# Patient Record
Sex: FEMALE | Race: WHITE | Employment: FULL TIME | URBAN - METROPOLITAN AREA
[De-identification: names, ages, dates, MRNs, and addresses within clinical notes are randomized per-mention and may not be internally consistent; named-entity substitution may affect disease eponyms.]

---

## 2018-03-13 LAB
ANTIBODY SCREEN, EXTERNAL: POSITIVE
CHLAMYDIA, EXTERNAL: NEGATIVE
HBSAG, EXTERNAL: NEGATIVE
HIV, EXTERNAL: NEGATIVE
N. GONORRHEA, EXTERNAL: NEGATIVE
RPR, EXTERNAL: NON REACTIVE
RUBELLA, EXTERNAL: NORMAL
TYPE, ABO & RH, EXTERNAL: NORMAL

## 2018-03-27 ENCOUNTER — HOSPITAL ENCOUNTER (OUTPATIENT)
Age: 33
Setting detail: OBSERVATION
Discharge: HOME | End: 2018-03-27
Attending: OBSTETRICS & GYNECOLOGY | Admitting: OBSTETRICS & GYNECOLOGY
Payer: COMMERCIAL

## 2018-03-27 VITALS
SYSTOLIC BLOOD PRESSURE: 111 MMHG | HEART RATE: 86 BPM | WEIGHT: 192 LBS | DIASTOLIC BLOOD PRESSURE: 62 MMHG | BODY MASS INDEX: 32.78 KG/M2 | HEIGHT: 64 IN | OXYGEN SATURATION: 100 %

## 2018-03-27 PROBLEM — Z3A.26 26 WEEKS GESTATION OF PREGNANCY: Status: ACTIVE | Noted: 2018-03-27

## 2018-03-27 PROBLEM — O47.00 PRETERM CONTRACTIONS: Status: ACTIVE | Noted: 2018-03-27

## 2018-03-27 PROBLEM — O09.219 HIGH RISK PREGNANCY DUE TO HISTORY OF PRETERM LABOR: Status: ACTIVE | Noted: 2018-03-27

## 2018-03-27 LAB
APPEARANCE UR: CLEAR
BILIRUB UR QL: NEGATIVE
COLOR UR: YELLOW
FIBRONECTIN FETAL VAG QL: NEGATIVE
GLUCOSE UR QL STRIP.AUTO: NEGATIVE MG/DL
KETONES UR-MCNC: NEGATIVE MG/DL
LEUKOCYTE ESTERASE UR QL STRIP: ABNORMAL
NITRITE UR QL: NEGATIVE
PH UR: 6 [PH] (ref 5–9)
PROT UR QL: NEGATIVE MG/DL
RBC # UR STRIP: NEGATIVE /UL
SERVICE CMNT-IMP: ABNORMAL
SP GR UR: 1.02 (ref 1–1.02)
UROBILINOGEN UR QL: 0.2 EU/DL (ref 0.2–1)

## 2018-03-27 PROCEDURE — 81003 URINALYSIS AUTO W/O SCOPE: CPT

## 2018-03-27 PROCEDURE — 82731 ASSAY OF FETAL FIBRONECTIN: CPT | Performed by: ADVANCED PRACTICE MIDWIFE

## 2018-03-27 PROCEDURE — 87109 MYCOPLASMA: CPT | Performed by: ADVANCED PRACTICE MIDWIFE

## 2018-03-27 PROCEDURE — 99218 HC RM OBSERVATION: CPT

## 2018-03-27 PROCEDURE — 87081 CULTURE SCREEN ONLY: CPT | Performed by: ADVANCED PRACTICE MIDWIFE

## 2018-03-27 PROCEDURE — 99282 EMERGENCY DEPT VISIT SF MDM: CPT

## 2018-03-27 PROCEDURE — 59025 FETAL NON-STRESS TEST: CPT

## 2018-03-27 PROCEDURE — 87491 CHLMYD TRACH DNA AMP PROBE: CPT | Performed by: ADVANCED PRACTICE MIDWIFE

## 2018-03-27 RX ORDER — AZITHROMYCIN 250 MG/1
500 TABLET, FILM COATED ORAL
Status: DISCONTINUED | OUTPATIENT
Start: 2018-03-27 | End: 2018-03-27

## 2018-03-27 RX ORDER — LEVOTHYROXINE SODIUM 75 UG/1
75 TABLET ORAL EVERY OTHER DAY
COMMUNITY

## 2018-03-27 RX ORDER — FERROUS SULFATE, DRIED 160(50) MG
1 TABLET, EXTENDED RELEASE ORAL 2 TIMES DAILY WITH MEALS
COMMUNITY

## 2018-03-27 NOTE — PROGRESS NOTES
1240 Patient is a  @ 26 weeks gestation w/ c/o abdominal cramping, and her concern is  labor. She states she had one child at 39 weeks and another child at 31 weeks gestation. Urine specimen obtained and patient oriented to Triage Bed 3. EFM/TOCO placed; abdomen palpates soft. Patient denies headache, blurry vision, epigastric pain; denies LOF or leaking blood; +FM. Call bell within reach. 1313 DANIELLE Buck CNM [facility] on unit and updated on patient status and patient chief complaint    1340 patient updated on poc including FFN ordered by Mariam Schroeder CNM; patient verbalized appropriate understanding. 1400 Strip reactive; verified w/ Nicole Vides, Charge RN    8234 DANIELLE Buck CNM [facility] at bedside; FFN collected; SVE closed, thick, high    1630 FFN negative; discharge order obtained; Educated patient regarding fetal kick counts, signs and symptoms of active labor, symptoms to report to MD, when to come back to hospital, and instructed on when to report symptoms to MD.  Discharge instructions reviewed and hard copies provided. Patient verbalized appropriate understanding. Patient ambulated off unit, no further concerns expressed at this time.
HPI:    Subjective:     [unfilled], 34 y.o.  , 1,1,0,2 at 26w0d presents to L&D with c/o cramping and abomdinal pain . Assessment:  Past Medical History:   Diagnosis Date    Abnormal Papanicolaou smear of cervix     Phlebitis and thrombophlebitis     Thyroid activity decreased      No past surgical history on file. No Known Allergies  Prior to Admission medications    Medication Sig Start Date End Date Taking? Authorizing Provider   PNV#16-Iron Fum & PS-FA-OM-3 35-1-200 mg cap Take  by mouth. Yes Historical Provider   levothyroxine (SYNTHROID) 75 mcg tablet Take 75 mcg by mouth Daily (before breakfast). Yes Historical Provider   calcium-vitamin D (OYSTER SHELL) 500 mg(1,250mg) -200 unit per tablet Take 1 Tab by mouth two (2) times daily (with meals). Yes Historical Provider        Objective:     Vitals:  Vitals:    18 1251   Weight: 87.1 kg (192 lb)   Height: 5' 4\" (1.626 m)        Physical Exam:  Patient without distress. Heart: Regular rate and rhythm, S1S2 present or without murmur or extra heart sounds  Lung: clear to auscultation throughout lung fields, no wheezes, no rales, no rhonchi and normal respiratory effort  Back: costovertebral angle tenderness absent  Abdomen: soft, nontender, gravid  Perineum: blood absent, amniotic fluid absent  Cervical Exam: closed  Lower Extremities:  - Edema No  Membranes:  Intact  Fetal Heart Rate: Reactive    U/A: Urine dipstick shows negative for all components, positive for few- leukocytes. Assessment/Plan:     Plan: FFN, GC/Chlamydia, GBS, ureaplasma sent to lab. If FFN discharge home. Follow up in office tomorrow for US for cervical lengths. DC home with standard & ER labor precautions.      Signed By:  Tobias Sherman CNM     2018
Floor

## 2018-03-27 NOTE — DISCHARGE INSTRUCTIONS
ULTRASOUND APPOINTMENT in your [place] OFFICE tomorrow, 3/28/17! DISCHARGE SUMMARY from Nurse    PATIENT INSTRUCTIONS:    After general anesthesia or intravenous sedation, for 24 hours or while taking prescription Narcotics:  · Limit your activities  · Do not drive and operate hazardous machinery  · Do not make important personal or business decisions  · Do  not drink alcoholic beverages  · If you have not urinated within 8 hours after discharge, please contact your surgeon on call. Report the following to your surgeon:  · Excessive pain, swelling, redness or odor of or around the surgical area  · Temperature over 100.5  · Nausea and vomiting lasting longer than 4 hours or if unable to take medications  · Any signs of decreased circulation or nerve impairment to extremity: change in color, persistent  numbness, tingling, coldness or increase pain  · Any questions    What to do at Home:  Recommended activity: Activity as tolerated,     If you experience any of the following symptoms gush of fluid or blood from your vagina; contractions 5 minutes apart, please come back to labor and delivery    *  Please give a list of your current medications to your Primary Care Provider. *  Please update this list whenever your medications are discontinued, doses are      changed, or new medications (including over-the-counter products) are added. *  Please carry medication information at all times in case of emergency situations. These are general instructions for a healthy lifestyle:    No smoking/ No tobacco products/ Avoid exposure to second hand smoke  Surgeon General's Warning:  Quitting smoking now greatly reduces serious risk to your health.     Obesity, smoking, and sedentary lifestyle greatly increases your risk for illness    A healthy diet, regular physical exercise & weight monitoring are important for maintaining a healthy lifestyle    You may be retaining fluid if you have a history of heart failure or if you experience any of the following symptoms:  Weight gain of 3 pounds or more overnight or 5 pounds in a week, increased swelling in our hands or feet or shortness of breath while lying flat in bed. Please call your doctor as soon as you notice any of these symptoms; do not wait until your next office visit. Recognize signs and symptoms of STROKE:    F-face looks uneven    A-arms unable to move or move unevenly    S-speech slurred or non-existent    T-time-call 911 as soon as signs and symptoms begin-DO NOT go       Back to bed or wait to see if you get better-TIME IS BRAIN. Warning Signs of HEART ATTACK     Call 911 if you have these symptoms:   Chest discomfort. Most heart attacks involve discomfort in the center of the chest that lasts more than a few minutes, or that goes away and comes back. It can feel like uncomfortable pressure, squeezing, fullness, or pain.  Discomfort in other areas of the upper body. Symptoms can include pain or discomfort in one or both arms, the back, neck, jaw, or stomach.  Shortness of breath with or without chest discomfort.  Other signs may include breaking out in a cold sweat, nausea, or lightheadedness. Don't wait more than five minutes to call 911 - MINUTES MATTER! Fast action can save your life. Calling 911 is almost always the fastest way to get lifesaving treatment. Emergency Medical Services staff can begin treatment when they arrive -- up to an hour sooner than if someone gets to the hospital by car. The discharge information has been reviewed with the patient. The patient verbalized understanding. Discharge medications reviewed with the patient and appropriate educational materials and side effects teaching were provided.   ___________________________________________________________________________________________________________________________________

## 2018-03-27 NOTE — IP AVS SNAPSHOT
[address] [place] [id number] 
[phone] Patient: Eden Ty MRN: [id number] ZRT:[birth year] A check cameron indicates which time of day the medication should be taken. My Medications ASK your doctor about these medications Instructions Each Dose to Equal  
 Morning Noon Evening Bedtime  
 calcium-vitamin D 500 mg(1,250mg) -200 unit per tablet Commonly known as:  OYSTER SHELL Your last dose was: Your next dose is: Take 1 Tab by mouth two (2) times daily (with meals). 1 Tab PNV#16-Iron Fum & PS-FA-OM-3 35-1-200 mg Cap Your last dose was: Your next dose is: Take  by mouth. SYNTHROID 75 mcg tablet Generic drug:  levothyroxine Your last dose was: Your next dose is: Take 75 mcg by mouth Daily (before breakfast). 75 mcg

## 2018-03-27 NOTE — IP AVS SNAPSHOT
Summary of Care Report The Summary of Care report has been created to help improve care coordination. Users with access to [employer] or [address] (Web-based application) may access additional patient information including the Discharge Summary. If you are not currently a [address] user and need more information, please call the number listed below in the [place] [address] section and ask to be connected with Medical Records. Facility Information Name Address Phone [facility] [place] [address] [place] [address] [phone] Patient Information Patient Name Sex WILBERTO Valadez ([id number]) Female 1985 Discharge Information Admitting Provider Service Area Unit Oletta Riedel, MD / [phone] [address] [facility] [address] [facility] / [phone] Discharge Provider Discharge Date/Time Discharge Disposition Destination (none) 3/27/2018 Afternoon (Pending) AHR (none) Patient Language Language ENGLISH [13] Hospital Problems as of 3/27/2018  Never Reviewed Class Noted - Resolved Last Modified POA Active Problems High risk pregnancy due to history of  labor  3/27/2018 - Present 3/27/2018 by Oletta Riedel, MD Unknown Entered by Oletta Riedel, MD  
   contractions  3/27/2018 - Present 3/27/2018 by Edi Huynh CNM Unknown Entered by Edi Huynh CNM  
  26 weeks gestation of pregnancy  3/27/2018 - Present 3/27/2018 by Edi Huynh CNM Unknown Entered by Edi Huynh CNM You are allergic to the following No active allergies Current Discharge Medication List  
  
ASK your doctor about these medications Dose & Instructions Dispensing Information Comments  
 calcium-vitamin D 500 mg(1,250mg) -200 unit per tablet Commonly known as:  OYSTER SHELL Dose:  1 Tab Take 1 Tab by mouth two (2) times daily (with meals). Refills:  0 PNV#16-Iron Fum & PS-FA-OM-3 35-1-200 mg Cap Take  by mouth. Refills:  0  
   
 SYNTHROID 75 mcg tablet Generic drug:  levothyroxine Dose:  75 mcg Take 75 mcg by mouth Daily (before breakfast). Refills:  0 Follow-up Information Follow up With Details Comments Contact Info None   None (395) Patient stated that they have no PCP Discharge Instructions ULTRASOUND APPOINTMENT in your [facility] OFFICE tomorrow, 3/28/17! DISCHARGE SUMMARY from Nurse PATIENT INSTRUCTIONS: 
 
 
F-face looks uneven A-arms unable to move or move unevenly S-speech slurred or non-existent T-time-call 911 as soon as signs and symptoms begin-DO NOT go Back to bed or wait to see if you get better-TIME IS BRAIN. Warning Signs of HEART ATTACK Call 911 if you have these symptoms: 
? Chest discomfort. Most heart attacks involve discomfort in the center of the chest that lasts more than a few minutes, or that goes away and comes back. It can feel like uncomfortable pressure, squeezing, fullness, or pain. ? Discomfort in other areas of the upper body. Symptoms can include pain or discomfort in one or both arms, the back, neck, jaw, or stomach. ? Shortness of breath with or without chest discomfort. ? Other signs may include breaking out in a cold sweat, nausea, or lightheadedness. Don't wait more than five minutes to call [address]! Fast action can save your life. Calling 911 is almost always the fastest way to get lifesaving treatment.  Emergency Medical Services staff can begin treatment when they arrive  up to an hour sooner than if someone gets to the hospital by car. The discharge information has been reviewed with the patient. The patient verbalized understanding. Discharge medications reviewed with the patient and appropriate educational materials and side effects teaching were provided. ___________________________________________________________________________________________________________________________________ Chart Review Routing History No Routing History on File

## 2018-03-27 NOTE — IP AVS SNAPSHOT
[address] [address] 
[phone] Patient: Danii Mora MRN: [id number] EVJ: About your hospitalization You were admitted on:  N/A You last received care in the:  [place] [address] [facility] L&D TRIAGE You were discharged on:  2018 Why you were hospitalized Your primary diagnosis was:  Not on File Your diagnoses also included:  High Risk Pregnancy Due To History Of  Labor,  Contractions, 26 Weeks Gestation Of Pregnancy Follow-up Information Follow up With Details Comments Contact Info None   None (395) Patient stated that they have no PCP Discharge Orders None A check cameron indicates which time of day the medication should be taken. My Medications ASK your doctor about these medications Instructions Each Dose to Equal  
 Morning Noon Evening Bedtime  
 calcium-vitamin D 500 mg(1,250mg) -200 unit per tablet Commonly known as:  OYSTER SHELL Your last dose was: Your next dose is: Take 1 Tab by mouth two (2) times daily (with meals). 1 Tab PNV#16-Iron Fum & PS-FA-OM-3 35-1-200 mg Cap Your last dose was: Your next dose is: Take  by mouth. SYNTHROID 75 mcg tablet Generic drug:  levothyroxine Your last dose was: Your next dose is: Take 75 mcg by mouth Daily (before breakfast). 75 mcg Discharge Instructions ULTRASOUND APPOINTMENT in your [facility] OFFICE tomorrow, 3/28/17! DISCHARGE SUMMARY from Nurse PATIENT INSTRUCTIONS: 
 
 
F-face looks uneven A-arms unable to move or move unevenly S-speech slurred or non-existent T-time-call 911 as soon as signs and symptoms begin-DO NOT go Back to bed or wait to see if you get better-TIME IS BRAIN. Warning Signs of HEART ATTACK Call 911 if you have these symptoms: 
? Chest discomfort. Most heart attacks involve discomfort in the center of the chest that lasts more than a few minutes, or that goes away and comes back. It can feel like uncomfortable pressure, squeezing, fullness, or pain. ? Discomfort in other areas of the upper body. Symptoms can include pain or discomfort in one or both arms, the back, neck, jaw, or stomach. ? Shortness of breath with or without chest discomfort. ? Other signs may include breaking out in a cold sweat, nausea, or lightheadedness. Don't wait more than five minutes to call [address]! Fast action can save your life. Calling 911 is almost always the fastest way to get lifesaving treatment. Emergency Medical Services staff can begin treatment when they arrive  up to an hour sooner than if someone gets to the hospital by car. The discharge information has been reviewed with the patient. The patient verbalized understanding. Discharge medications reviewed with the patient and appropriate educational materials and side effects teaching were provided. ___________________________________________________________________________________________________________________________________ Introducing [facility]! Ryan Marshall introduces Modern Message patient portal. Now you can access parts of your medical record, email your doctor's office, and request medication refills online. 1. In your internet browser, go to [email or web address]"t 2. Click on the First Time User? Click Here link in the Sign In box. You will see the New Member Sign Up page. 3. Enter your Modern Message Access Code exactly as it appears below.  You will not need to use this code after youve completed the sign-up process. If you do not sign up before the expiration date, you must request a new code. · [employer] Access Code: [email or web address] Expires: 6/25/2018  4:27 PM 
 
4. Enter the last four digits of your Social Security Number (xxxx) and Date of Birth (mm/dd/yyyy) as indicated and click Submit. You will be taken to the next sign-up page. 5. Create a Agrican ID. This will be your Agrican login ID and cannot be changed, so think of one that is secure and easy to remember. 6. Create a [employer] password. You can change your password at any time. 7. Enter your Password Reset Question and Answer. This can be used at a later time if you forget your password. 8. Enter your e-mail address. You will receive e-mail notification when new information is available in [address] 9. Click Sign Up. You can now view and download portions of your medical record. 10. Click the Download Summary menu link to download a portable copy of your medical information. If you have questions, please visit the Frequently Asked Questions section of the [employer] website. Remember, Agrican is NOT to be used for urgent needs. For medical emergencies, dial 911. Now available from your iPhone and Android! Introducing Srinivasa Gurrola As a [facility] patient, I wanted to make you aware of our electronic visit tool called Srinivasa Izabela. [facility] 24/7 allows you to connect within minutes with a medical provider 24 hours a day, seven days a week via a mobile device or tablet or logging into a secure website from your computer. You can access Srinivasa Gurrola from anywhere in the [place].  
 
A virtual visit might be right for you when you have a simple condition and feel like you just dont want to get out of bed, or cant get away from work for an appointment, when your regular [facility] provider is not available (evenings, weekends or holidays), or when youre out of town and need minor care. Electronic visits cost only $49 and if the [employer] 24/7 provider determines a prescription is needed to treat your condition, one can be electronically transmitted to a nearby pharmacy*. Please take a moment to enroll today if you have not already done so. The enrollment process is free and takes just a few minutes. To enroll, please download the Webflakes/7 malia to your tablet or phone, or visit [email or web address] to enroll on your computer. And, as an [address] [place] [address] patient with a [employer] account, the results of your visits will be scanned into your electronic medical record and your primary care provider will be able to view the scanned results. We urge you to continue to see your regular [facility] provider for your ongoing medical care. And while your primary care provider may not be the one available when you seek a Fast FiBR virtual visit, the peace of mind you get from getting a real diagnosis real time can be priceless. For more information on Fast FiBR, view our Frequently Asked Questions (FAQs) at [email or web address]. Sincerely, 
 
Andres Alonso MD 
Chief Medical Officer [facility] Thelma Best *:  certain medications cannot be prescribed via Fast FiBR Unresulted Labs-Please follow up with your PCP about these lab tests Order Current Status CHLAMYDIA/NEISSERIA AMPLIFICATION In process CULTURE, GENITAL GROUP B STREP In process UREAPLASMA / MYCO CULTURE In process Providers Seen During Your Hospitalization Provider Specialty Primary office phone Noris Rapp MD Obstetrics & Gynecology [phone] Your Primary Care Physician (PCP) Primary Care Physician Office Phone Office Fax NONE ** None ** ** None ** You are allergic to the following No active allergies Recent Documentation Height Weight BMI OB Status Smoking Status 1.626 m 87.1 kg 32.96 kg/m2 Pregnant Never Smoker Emergency Contacts Name Discharge Info Relation Home Work Mobile Blaine Boogie DISCHARGE CAREGIVER [3] Spouse [3]   [phone] Patient Belongings The following personal items are in your possession at time of discharge: 
                             
 
  
  
Discharge Instructions Attachments/References PREGNANCY: WEEKS 26 TO 30 (ENGLISH) Patient Handouts Weeks 26 to 30 of Your Pregnancy: Care Instructions Your Care Instructions You are now in your last trimester of pregnancy. Your baby is growing rapidly. And you'll probably feel your baby moving around more often. Your doctor may ask you to count your baby's kicks. Your back may ache as your body gets used to your baby's size and length. If you haven't already had the Tdap shot during this pregnancy, talk to your doctor about getting it. It will help protect your  against pertussis infection. During this time, it's important to take care of yourself and pay attention to what your body needs. If you feel sexual, explore ways to be close with your partner that match your comfort and desire. Use the tips provided in this care sheet to find ways to be sexual in your own way. Follow-up care is a key part of your treatment and safety. Be sure to make and go to all appointments, and call your doctor if you are having problems. It's also a good idea to know your test results and keep a list of the medicines you take. How can you care for yourself at home? Take it easy at work · Take frequent breaks. If possible, stop working when you are tired, and rest during your lunch hour. · Take bathroom breaks every 2 hours. · Change positions often. If you sit for long periods, stand up and walk around.  
· When you stand for a long time, keep one foot on a low stool with your knee bent. After standing a lot, sit with your feet up. · Avoid fumes, chemicals, and tobacco smoke. Be sexual in your own way · Having sex during pregnancy is okay, unless your doctor tells you not to. · You may be very interested in sex, or you may have no interest at all. · Your growing belly can make it hard to find a good position during intercourse. [place] and explore. · You may get cramps in your uterus when your partner touches your breasts. · A back rub may relieve the backache or cramps that sometimes follow orgasm. Learn about  labor · Watch for signs of  labor. You may be going into labor if: 
¨ You have menstrual-like cramps, with or without nausea. ¨ You have about 4 or more contractions in 20 minutes, or about 8 or more within 1 hour, even after you have had a glass of water and are resting. ¨ You have a low, dull backache that does not go away when you change your position. ¨ You have pain or pressure in your pelvis that comes and goes in a pattern. ¨ You have intestinal cramping or flu-like symptoms, with or without diarrhea. ¨ You notice an increase or change in your vaginal discharge. Discharge may be heavy, mucus-like, watery, or streaked with blood. ¨ Your water breaks. · If you think you have  labor: ¨ Drink 2 or 3 glasses of water or juice. Not drinking enough fluids can cause contractions. ¨ Stop what you are doing, and empty your bladder. Then lie down on your left side for at least 1 hour. ¨ While lying on your side, find your breast bone. Put your fingers in the soft spot just below it. Move your fingers down toward your belly button to find the top of your uterus. Check to see if it is tight. ¨ Contractions can be weak or strong. Record your contractions for an hour. Time a contraction from the start of one contraction to the start of the next one.  
¨ Single or several strong contractions without a pattern are called Bernabe-Mcgrath contractions. They are practice contractions but not the start of labor. They often stop if you change what you are doing. ¨ Call your doctor if you have regular contractions. Where can you learn more? Go to http://dea-jose a.info/. Enter M469 in the search box to learn more about \"Weeks 26 to 30 of Your Pregnancy: Care Instructions. \" Current as of: March 16, 2017 Content Version: 11.4 © 2006-2017 Healthwise, Incorporated. Care instructions adapted under license by [employer] (which disclaims liability or warranty for this information). If you have questions about a medical condition or this instruction, always ask your healthcare professional. [address] any warranty or liability for your use of this information. Please provide this summary of care documentation to your next provider. Signatures-by signing, you are acknowledging that this After Visit Summary has been reviewed with you and you have received a copy. Patient Signature:  ____________________________________________________________ Date:  ____________________________________________________________  
  
Melvin Burrell Provider Signature:  ____________________________________________________________ Date:  ____________________________________________________________

## 2018-03-28 LAB
C TRACH RRNA SPEC QL NAA+PROBE: NEGATIVE
N GONORRHOEA RRNA SPEC QL NAA+PROBE: NEGATIVE
SPECIMEN SOURCE: NORMAL

## 2018-03-30 LAB
BACTERIA SPEC CULT: NEGATIVE
SERVICE CMNT-IMP: NORMAL

## 2018-04-02 LAB
M HOMINIS SPEC QL CULT: NEGATIVE
SPECIMEN SOURCE: NORMAL
U UREALYTICUM SPEC QL CULT: NEGATIVE

## 2018-06-06 LAB — GRBS, EXTERNAL: NEGATIVE

## 2018-06-27 ENCOUNTER — ANESTHESIA (OUTPATIENT)
Dept: LABOR AND DELIVERY | Age: 33
End: 2018-06-27
Payer: COMMERCIAL

## 2018-06-27 ENCOUNTER — ANESTHESIA EVENT (OUTPATIENT)
Dept: LABOR AND DELIVERY | Age: 33
End: 2018-06-27
Payer: COMMERCIAL

## 2018-06-27 ENCOUNTER — HOSPITAL ENCOUNTER (INPATIENT)
Age: 33
LOS: 2 days | Discharge: HOME | End: 2018-06-29
Attending: OBSTETRICS & GYNECOLOGY | Admitting: OBSTETRICS & GYNECOLOGY
Payer: COMMERCIAL

## 2018-06-27 PROBLEM — M54.9 BACK PAIN WITH SCIATICA: Status: ACTIVE | Noted: 2018-06-27

## 2018-06-27 PROBLEM — M54.30 BACK PAIN WITH SCIATICA: Status: ACTIVE | Noted: 2018-06-27

## 2018-06-27 PROBLEM — Z3A.39 39 WEEKS GESTATION OF PREGNANCY: Status: ACTIVE | Noted: 2018-06-27

## 2018-06-27 PROBLEM — Z33.1 IUP (INTRAUTERINE PREGNANCY), INCIDENTAL: Status: ACTIVE | Noted: 2018-06-27

## 2018-06-27 LAB
ABO + RH BLD: NORMAL
BASOPHILS # BLD: 0 K/UL (ref 0–0.06)
BASOPHILS NFR BLD: 0 % (ref 0–2)
BLOOD GROUP ANTIBODIES SERPL: NORMAL
DIFFERENTIAL METHOD BLD: ABNORMAL
EOSINOPHIL # BLD: 0.1 K/UL (ref 0–0.4)
EOSINOPHIL NFR BLD: 1 % (ref 0–5)
ERYTHROCYTE [DISTWIDTH] IN BLOOD BY AUTOMATED COUNT: 15.4 % (ref 11.6–14.5)
HCT VFR BLD AUTO: 29.7 % (ref 35–45)
HGB BLD-MCNC: 9.2 G/DL (ref 12–16)
LYMPHOCYTES # BLD: 1.9 K/UL (ref 0.9–3.6)
LYMPHOCYTES NFR BLD: 27 % (ref 21–52)
MCH RBC QN AUTO: 22.9 PG (ref 24–34)
MCHC RBC AUTO-ENTMCNC: 31 G/DL (ref 31–37)
MCV RBC AUTO: 74.1 FL (ref 74–97)
MONOCYTES # BLD: 0.8 K/UL (ref 0.05–1.2)
MONOCYTES NFR BLD: 11 % (ref 3–10)
NEUTS SEG # BLD: 4.4 K/UL (ref 1.8–8)
NEUTS SEG NFR BLD: 61 % (ref 40–73)
PLATELET # BLD AUTO: 264 K/UL (ref 135–420)
PMV BLD AUTO: 8.9 FL (ref 9.2–11.8)
RBC # BLD AUTO: 4.01 M/UL (ref 4.2–5.3)
SPECIMEN EXP DATE BLD: NORMAL
WBC # BLD AUTO: 7.1 K/UL (ref 4.6–13.2)

## 2018-06-27 PROCEDURE — 65270000029 HC RM PRIVATE

## 2018-06-27 PROCEDURE — 74011250636 HC RX REV CODE- 250/636: Performed by: ADVANCED PRACTICE MIDWIFE

## 2018-06-27 PROCEDURE — 77030018749 HC HK AMNIO DISP DERY -A

## 2018-06-27 PROCEDURE — 75410000002 HC LABOR FEE PER 1 HR

## 2018-06-27 PROCEDURE — 74011250636 HC RX REV CODE- 250/636

## 2018-06-27 PROCEDURE — 10907ZC DRAINAGE OF AMNIOTIC FLUID, THERAPEUTIC FROM PRODUCTS OF CONCEPTION, VIA NATURAL OR ARTIFICIAL OPENING: ICD-10-PCS | Performed by: OBSTETRICS & GYNECOLOGY

## 2018-06-27 PROCEDURE — 74011000250 HC RX REV CODE- 250

## 2018-06-27 PROCEDURE — 85025 COMPLETE CBC W/AUTO DIFF WBC: CPT | Performed by: OBSTETRICS & GYNECOLOGY

## 2018-06-27 PROCEDURE — 74011250637 HC RX REV CODE- 250/637: Performed by: ADVANCED PRACTICE MIDWIFE

## 2018-06-27 PROCEDURE — 77030034849

## 2018-06-27 PROCEDURE — 3E0R3BZ INTRODUCTION OF ANESTHETIC AGENT INTO SPINAL CANAL, PERCUTANEOUS APPROACH: ICD-10-PCS | Performed by: ANESTHESIOLOGY

## 2018-06-27 PROCEDURE — 00HU33Z INSERTION OF INFUSION DEVICE INTO SPINAL CANAL, PERCUTANEOUS APPROACH: ICD-10-PCS | Performed by: ANESTHESIOLOGY

## 2018-06-27 PROCEDURE — 74011250636 HC RX REV CODE- 250/636: Performed by: OBSTETRICS & GYNECOLOGY

## 2018-06-27 PROCEDURE — 86900 BLOOD TYPING SEROLOGIC ABO: CPT | Performed by: OBSTETRICS & GYNECOLOGY

## 2018-06-27 PROCEDURE — 74011250636 HC RX REV CODE- 250/636: Performed by: ANESTHESIOLOGY

## 2018-06-27 PROCEDURE — 75410000003 HC RECOV DEL/VAG/CSECN EA 0.5 HR

## 2018-06-27 PROCEDURE — 75410000000 HC DELIVERY VAGINAL/SINGLE

## 2018-06-27 PROCEDURE — 76060000078 HC EPIDURAL ANESTHESIA

## 2018-06-27 RX ORDER — FENTANYL CITRATE 50 UG/ML
INJECTION, SOLUTION INTRAMUSCULAR; INTRAVENOUS
Status: COMPLETED
Start: 2018-06-27 | End: 2018-06-27

## 2018-06-27 RX ORDER — OXYTOCIN IN 5 % DEXTROSE 30/500 ML
.5-2 PLASTIC BAG, INJECTION (ML) INTRAVENOUS
Status: DISCONTINUED | OUTPATIENT
Start: 2018-06-27 | End: 2018-06-29 | Stop reason: HOSPADM

## 2018-06-27 RX ORDER — IBUPROFEN 400 MG/1
800 TABLET ORAL
Status: DISCONTINUED | OUTPATIENT
Start: 2018-06-27 | End: 2018-06-29 | Stop reason: HOSPADM

## 2018-06-27 RX ORDER — FENTANYL CITRATE 50 UG/ML
INJECTION, SOLUTION INTRAMUSCULAR; INTRAVENOUS AS NEEDED
Status: DISCONTINUED | OUTPATIENT
Start: 2018-06-27 | End: 2018-06-27 | Stop reason: HOSPADM

## 2018-06-27 RX ORDER — PROMETHAZINE HYDROCHLORIDE 25 MG/ML
25 INJECTION, SOLUTION INTRAMUSCULAR; INTRAVENOUS
Status: DISCONTINUED | OUTPATIENT
Start: 2018-06-27 | End: 2018-06-29 | Stop reason: HOSPADM

## 2018-06-27 RX ORDER — ROPIVACAINE HYDROCHLORIDE 2 MG/ML
INJECTION, SOLUTION EPIDURAL; INFILTRATION; PERINEURAL AS NEEDED
Status: DISCONTINUED | OUTPATIENT
Start: 2018-06-27 | End: 2018-06-27 | Stop reason: HOSPADM

## 2018-06-27 RX ORDER — TERBUTALINE SULFATE 1 MG/ML
0.25 INJECTION SUBCUTANEOUS
Status: DISCONTINUED | OUTPATIENT
Start: 2018-06-27 | End: 2018-06-28 | Stop reason: HOSPADM

## 2018-06-27 RX ORDER — LIDOCAINE HYDROCHLORIDE 10 MG/ML
20 INJECTION, SOLUTION EPIDURAL; INFILTRATION; INTRACAUDAL; PERINEURAL AS NEEDED
Status: DISCONTINUED | OUTPATIENT
Start: 2018-06-27 | End: 2018-06-28 | Stop reason: HOSPADM

## 2018-06-27 RX ORDER — SODIUM CHLORIDE, SODIUM LACTATE, POTASSIUM CHLORIDE, CALCIUM CHLORIDE 600; 310; 30; 20 MG/100ML; MG/100ML; MG/100ML; MG/100ML
125 INJECTION, SOLUTION INTRAVENOUS CONTINUOUS
Status: DISCONTINUED | OUTPATIENT
Start: 2018-06-27 | End: 2018-06-28 | Stop reason: HOSPADM

## 2018-06-27 RX ORDER — AMOXICILLIN 250 MG
1 CAPSULE ORAL
Status: DISCONTINUED | OUTPATIENT
Start: 2018-06-27 | End: 2018-06-29 | Stop reason: HOSPADM

## 2018-06-27 RX ORDER — MINERAL OIL
30 OIL (ML) ORAL AS NEEDED
Status: COMPLETED | OUTPATIENT
Start: 2018-06-27 | End: 2018-06-27

## 2018-06-27 RX ORDER — SODIUM CHLORIDE 0.9 % (FLUSH) 0.9 %
5-10 SYRINGE (ML) INJECTION EVERY 8 HOURS
Status: DISCONTINUED | OUTPATIENT
Start: 2018-06-27 | End: 2018-06-28 | Stop reason: HOSPADM

## 2018-06-27 RX ORDER — FENTANYL/ROPIVACAINE/NS/PF 2MCG/ML-.1
1-15 PLASTIC BAG, INJECTION (ML) EPIDURAL
Status: DISCONTINUED | OUTPATIENT
Start: 2018-06-27 | End: 2018-06-28 | Stop reason: HOSPADM

## 2018-06-27 RX ORDER — OXYTOCIN/RINGER'S LACTATE 20/1000 ML
125 PLASTIC BAG, INJECTION (ML) INTRAVENOUS CONTINUOUS
Status: DISCONTINUED | OUTPATIENT
Start: 2018-06-27 | End: 2018-06-28 | Stop reason: HOSPADM

## 2018-06-27 RX ORDER — NALOXONE HYDROCHLORIDE 0.4 MG/ML
0.2 INJECTION, SOLUTION INTRAMUSCULAR; INTRAVENOUS; SUBCUTANEOUS AS NEEDED
Status: DISCONTINUED | OUTPATIENT
Start: 2018-06-27 | End: 2018-06-28 | Stop reason: HOSPADM

## 2018-06-27 RX ORDER — LEVOTHYROXINE SODIUM 75 UG/1
75 TABLET ORAL EVERY OTHER DAY
Status: DISCONTINUED | OUTPATIENT
Start: 2018-06-29 | End: 2018-06-28

## 2018-06-27 RX ORDER — SODIUM CHLORIDE 0.9 % (FLUSH) 0.9 %
5-10 SYRINGE (ML) INJECTION AS NEEDED
Status: DISCONTINUED | OUTPATIENT
Start: 2018-06-27 | End: 2018-06-28 | Stop reason: HOSPADM

## 2018-06-27 RX ORDER — LIDOCAINE HYDROCHLORIDE 20 MG/ML
INJECTION, SOLUTION EPIDURAL; INFILTRATION; INTRACAUDAL; PERINEURAL AS NEEDED
Status: DISCONTINUED | OUTPATIENT
Start: 2018-06-27 | End: 2018-06-27 | Stop reason: HOSPADM

## 2018-06-27 RX ORDER — OXYTOCIN IN 5 % DEXTROSE 30/500 ML
PLASTIC BAG, INJECTION (ML) INTRAVENOUS
Status: COMPLETED
Start: 2018-06-27 | End: 2018-06-27

## 2018-06-27 RX ORDER — METHYLERGONOVINE MALEATE 0.2 MG/ML
0.2 INJECTION INTRAVENOUS AS NEEDED
Status: DISCONTINUED | OUTPATIENT
Start: 2018-06-27 | End: 2018-06-27

## 2018-06-27 RX ORDER — BUTORPHANOL TARTRATE 2 MG/ML
2 INJECTION INTRAMUSCULAR; INTRAVENOUS
Status: DISCONTINUED | OUTPATIENT
Start: 2018-06-27 | End: 2018-06-27

## 2018-06-27 RX ORDER — METHYLERGONOVINE MALEATE 0.2 MG/ML
0.2 INJECTION INTRAVENOUS AS NEEDED
Status: DISCONTINUED | OUTPATIENT
Start: 2018-06-27 | End: 2018-06-28 | Stop reason: HOSPADM

## 2018-06-27 RX ORDER — NALBUPHINE HYDROCHLORIDE 10 MG/ML
10 INJECTION, SOLUTION INTRAMUSCULAR; INTRAVENOUS; SUBCUTANEOUS
Status: DISCONTINUED | OUTPATIENT
Start: 2018-06-27 | End: 2018-06-27

## 2018-06-27 RX ORDER — HYDROMORPHONE HYDROCHLORIDE 2 MG/ML
1 INJECTION, SOLUTION INTRAMUSCULAR; INTRAVENOUS; SUBCUTANEOUS
Status: DISCONTINUED | OUTPATIENT
Start: 2018-06-27 | End: 2018-06-28 | Stop reason: HOSPADM

## 2018-06-27 RX ORDER — OXYTOCIN/RINGER'S LACTATE 20/1000 ML
125 PLASTIC BAG, INJECTION (ML) INTRAVENOUS CONTINUOUS
Status: DISCONTINUED | OUTPATIENT
Start: 2018-06-27 | End: 2018-06-27

## 2018-06-27 RX ORDER — OXYCODONE AND ACETAMINOPHEN 5; 325 MG/1; MG/1
2 TABLET ORAL
Status: DISCONTINUED | OUTPATIENT
Start: 2018-06-27 | End: 2018-06-29 | Stop reason: HOSPADM

## 2018-06-27 RX ORDER — TERBUTALINE SULFATE 1 MG/ML
0.25 INJECTION SUBCUTANEOUS
Status: DISCONTINUED | OUTPATIENT
Start: 2018-06-27 | End: 2018-06-27

## 2018-06-27 RX ORDER — OXYTOCIN/RINGER'S LACTATE 20/1000 ML
500 PLASTIC BAG, INJECTION (ML) INTRAVENOUS ONCE
Status: DISPENSED | OUTPATIENT
Start: 2018-06-27 | End: 2018-06-27

## 2018-06-27 RX ORDER — LEVOTHYROXINE SODIUM 50 UG/1
50 TABLET ORAL EVERY OTHER DAY
Refills: 0 | COMMUNITY
Start: 2018-06-04 | End: 2018-06-29

## 2018-06-27 RX ORDER — ONDANSETRON 2 MG/ML
4 INJECTION INTRAMUSCULAR; INTRAVENOUS
Status: DISCONTINUED | OUTPATIENT
Start: 2018-06-27 | End: 2018-06-28 | Stop reason: HOSPADM

## 2018-06-27 RX ORDER — FENTANYL CITRATE 50 UG/ML
100 INJECTION, SOLUTION INTRAMUSCULAR; INTRAVENOUS ONCE
Status: COMPLETED | OUTPATIENT
Start: 2018-06-27 | End: 2018-06-27

## 2018-06-27 RX ORDER — LIDOCAINE HYDROCHLORIDE AND EPINEPHRINE 15; 5 MG/ML; UG/ML
INJECTION, SOLUTION EPIDURAL AS NEEDED
Status: DISCONTINUED | OUTPATIENT
Start: 2018-06-27 | End: 2018-06-27 | Stop reason: HOSPADM

## 2018-06-27 RX ORDER — HYDROMORPHONE HYDROCHLORIDE 2 MG/ML
1 INJECTION, SOLUTION INTRAMUSCULAR; INTRAVENOUS; SUBCUTANEOUS
Status: DISCONTINUED | OUTPATIENT
Start: 2018-06-27 | End: 2018-06-27

## 2018-06-27 RX ORDER — OXYTOCIN/RINGER'S LACTATE 20/1000 ML
500 PLASTIC BAG, INJECTION (ML) INTRAVENOUS ONCE
Status: ACTIVE | OUTPATIENT
Start: 2018-06-27 | End: 2018-06-27

## 2018-06-27 RX ORDER — SODIUM CHLORIDE, SODIUM LACTATE, POTASSIUM CHLORIDE, CALCIUM CHLORIDE 600; 310; 30; 20 MG/100ML; MG/100ML; MG/100ML; MG/100ML
125 INJECTION, SOLUTION INTRAVENOUS CONTINUOUS
Status: DISCONTINUED | OUTPATIENT
Start: 2018-06-27 | End: 2018-06-27

## 2018-06-27 RX ORDER — LIDOCAINE HYDROCHLORIDE 10 MG/ML
20 INJECTION, SOLUTION EPIDURAL; INFILTRATION; INTRACAUDAL; PERINEURAL AS NEEDED
Status: DISCONTINUED | OUTPATIENT
Start: 2018-06-27 | End: 2018-06-27

## 2018-06-27 RX ORDER — MINERAL OIL
30 OIL (ML) ORAL AS NEEDED
Status: DISCONTINUED | OUTPATIENT
Start: 2018-06-27 | End: 2018-06-27

## 2018-06-27 RX ORDER — FENTANYL/ROPIVACAINE/NS/PF 2MCG/ML-.1
PLASTIC BAG, INJECTION (ML) EPIDURAL
Status: COMPLETED
Start: 2018-06-27 | End: 2018-06-27

## 2018-06-27 RX ORDER — ZOLPIDEM TARTRATE 5 MG/1
5 TABLET ORAL
Status: DISCONTINUED | OUTPATIENT
Start: 2018-06-27 | End: 2018-06-29 | Stop reason: HOSPADM

## 2018-06-27 RX ORDER — DIPHENHYDRAMINE HYDROCHLORIDE 50 MG/ML
25 INJECTION, SOLUTION INTRAMUSCULAR; INTRAVENOUS
Status: DISCONTINUED | OUTPATIENT
Start: 2018-06-27 | End: 2018-06-28 | Stop reason: HOSPADM

## 2018-06-27 RX ORDER — NALBUPHINE HYDROCHLORIDE 10 MG/ML
10 INJECTION, SOLUTION INTRAMUSCULAR; INTRAVENOUS; SUBCUTANEOUS
Status: DISCONTINUED | OUTPATIENT
Start: 2018-06-27 | End: 2018-06-28 | Stop reason: HOSPADM

## 2018-06-27 RX ORDER — BUTORPHANOL TARTRATE 2 MG/ML
2 INJECTION INTRAMUSCULAR; INTRAVENOUS
Status: DISCONTINUED | OUTPATIENT
Start: 2018-06-27 | End: 2018-06-28 | Stop reason: HOSPADM

## 2018-06-27 RX ORDER — ACETAMINOPHEN 325 MG/1
650 TABLET ORAL
Status: DISCONTINUED | OUTPATIENT
Start: 2018-06-27 | End: 2018-06-29 | Stop reason: HOSPADM

## 2018-06-27 RX ORDER — LEVOTHYROXINE SODIUM 100 UG/1
50 TABLET ORAL EVERY OTHER DAY
Status: DISCONTINUED | OUTPATIENT
Start: 2018-06-28 | End: 2018-06-28

## 2018-06-27 RX ADMIN — ROPIVACAINE HYDROCHLORIDE 14 ML/HR: 10 INJECTION, SOLUTION EPIDURAL at 17:53

## 2018-06-27 RX ADMIN — FENTANYL CITRATE 100 MCG: 50 INJECTION, SOLUTION INTRAMUSCULAR; INTRAVENOUS at 18:43

## 2018-06-27 RX ADMIN — Medication 125 ML/HR: at 20:51

## 2018-06-27 RX ADMIN — ROPIVACAINE HYDROCHLORIDE 3 ML: 2 INJECTION, SOLUTION EPIDURAL; INFILTRATION; PERINEURAL at 11:44

## 2018-06-27 RX ADMIN — Medication 2 MILLI-UNITS/MIN: at 08:30

## 2018-06-27 RX ADMIN — SODIUM CHLORIDE, SODIUM LACTATE, POTASSIUM CHLORIDE, AND CALCIUM CHLORIDE 125 ML/HR: 600; 310; 30; 20 INJECTION, SOLUTION INTRAVENOUS at 08:30

## 2018-06-27 RX ADMIN — ROPIVACAINE HYDROCHLORIDE 4 ML: 2 INJECTION, SOLUTION EPIDURAL; INFILTRATION; PERINEURAL at 11:46

## 2018-06-27 RX ADMIN — FENTANYL CITRATE 100 MCG: 50 INJECTION, SOLUTION INTRAMUSCULAR; INTRAVENOUS at 11:46

## 2018-06-27 RX ADMIN — FENTANYL CITRATE 100 MCG: 50 INJECTION, SOLUTION INTRAMUSCULAR; INTRAVENOUS at 11:17

## 2018-06-27 RX ADMIN — ROPIVACAINE HYDROCHLORIDE 10 ML/HR: 10 INJECTION, SOLUTION EPIDURAL at 11:54

## 2018-06-27 RX ADMIN — ROPIVACAINE HYDROCHLORIDE 6 ML: 2 INJECTION, SOLUTION EPIDURAL; INFILTRATION; PERINEURAL at 17:39

## 2018-06-27 RX ADMIN — LIDOCAINE HYDROCHLORIDE 6 ML: 20 INJECTION, SOLUTION EPIDURAL; INFILTRATION; INTRACAUDAL; PERINEURAL at 18:59

## 2018-06-27 RX ADMIN — IBUPROFEN 800 MG: 400 TABLET ORAL at 23:17

## 2018-06-27 RX ADMIN — Medication 30 ML: at 20:35

## 2018-06-27 RX ADMIN — ROPIVACAINE HYDROCHLORIDE 3 ML: 2 INJECTION, SOLUTION EPIDURAL; INFILTRATION; PERINEURAL at 11:45

## 2018-06-27 RX ADMIN — FENTANYL CITRATE 100 MCG: 50 INJECTION, SOLUTION INTRAMUSCULAR; INTRAVENOUS at 18:53

## 2018-06-27 RX ADMIN — LIDOCAINE HYDROCHLORIDE AND EPINEPHRINE 3 ML: 15; 5 INJECTION, SOLUTION EPIDURAL at 11:41

## 2018-06-27 RX ADMIN — SODIUM CHLORIDE, SODIUM LACTATE, POTASSIUM CHLORIDE, AND CALCIUM CHLORIDE 1000 ML: 600; 310; 30; 20 INJECTION, SOLUTION INTRAVENOUS at 11:05

## 2018-06-27 NOTE — PROGRESS NOTES
Labor Progress Note  Patient seen, fetal heart rate and contraction pattern evaluated and reassuring, Patient examined. Patient Vitals for the past 1 hrs:   BP Pulse SpO2   06/27/18 1355 - - 99 %   06/27/18 1350 - - 100 %   06/27/18 1346 108/59 68 -   06/27/18 1345 - - 100 %   06/27/18 1340 - - 100 %   06/27/18 1335 - - 100 %   06/27/18 1331 99/67 73 -   06/27/18 1330 - - 100 %   06/27/18 1325 - - 99 %   06/27/18 1320 - - 100 %   06/27/18 1316 103/56 72 -   06/27/18 1315 - - 99 %       Physical Exam:  Cervical Exam:  3 cm dilated    80% effaced    -2 station    Presenting Part: cephalic  Cervical Position: posterior  Consistency: Medium  Membranes:  Intact  Uterine Activity: Frequency: Every 2-4 minutes  Fetal Heart Rate: Reactive  Baseline: 120 per minute  Variability: moderate  Accelerations: yes  Decelerations: none    Assessment/Plan:  Reassuring fetal status, Labor  Continue expectant management. Continue pitocin induction. Continue plan for vaginal delivery. Repositioning and use of peanut ball for labor progress.

## 2018-06-27 NOTE — PROGRESS NOTES
7558  Patient who is  39+1 week gestation  Arrives on unit for schedule induction due to sciatica. Patient is taken to room 6 , given gown. IV started, labs drawn, assessment and documents completed. Pitocin started at 0830.    1005 Patient up to bathroom    1045  Patient up to bathroom    1110 Patient up to bathroom    12 Paged anesthesia for epidural.  Doc will arrive shortly. 1120 SVE 3/80/-3 done by ZANDRA Campbell Dr. At bedside/TO/Consents    1140 Thread    1141 test    1144 bolus    Asked patient about Valtrex mentioned in chart. She told me that the Valtrex must have been \"mischarted\" and she \"does not take it and has no history. \"    1300 Patient or right side. 1326 Patient on left side with peanut ball. 1357 SVE 3/80/-2 per Anastasiia Chambers CNM    1400 Patient on left side with peanut ball    1442 Patient on back. 1452 Patient on right side with peanut ball. 1534 Patient sitting upright    1704 SVE 4/80/-2 per Herrera Maher 28 Paged Dr. Waylon Wilson for epidural bolus as patient is reporting no relief from epidural.    1714 JAMES Duong at bedside. Advised increasing basal to 11. She gave bolus. 65 Dr. Waylon Wilson at bedside again . Patient reports pain has not lessened from last bolus. Left side  Is less numb than right. Will bolus epidural with medication. Advises putting basal rate up to 14.      1750 SVE 6/80/-2 per ZANDRA Melton RN    9931 SHERLYN Bañuelos at bedside. AROM clear fluid. SVE 6/80/0    1800 Patient on left side with peanut ball    1820 Paged anesthesia to request new epidural as patient is reporting no relief. [address] Edda MACIAS at bedside. SVE 7/100/0.     1830 Stopped pitocin per Anastasiia Chambers CNM and with patient request.    Jm Gill at bedside    1844 TO    1858 test dose    1858 Catheter thread     1901 Fentanyl to anesthesia    1904 bolus    1930 Patient on left side with peanut ball.     1920 Bedside shift change report given to Yariel Looney RN (oncoming nurse) by Robby Connolly RN (offgoing nurse). Report included the following information SBAR, Procedure Summary, Intake/Output, MAR and Recent Results.

## 2018-06-27 NOTE — IP AVS SNAPSHOT
[address] [address] 
[phone] Patient: Edinson Clemens MRN: [id number] TLU:4/39/3416 A check cameron indicates which time of day the medication should be taken. My Medications START taking these medications Instructions Each Dose to Equal  
 Morning Noon Evening Bedtime  
 ibuprofen 800 mg tablet Commonly known as:  MOTRIN Your last dose was: Your next dose is: Take 1 Tab by mouth every eight (8) hours as needed. 800 mg  
    
   
   
   
  
 predniSONE 10 mg tablet Commonly known as:  Alley Aver Your last dose was: Your next dose is: Take 4 Tabs by mouth daily (with breakfast). Take 40 mg x 2 days, followed by 30 mg x 2 days, 20 mg x2 days and then 10 mg x 2 days 40 mg CHANGE how you take these medications Instructions Each Dose to Equal  
 Morning Noon Evening Bedtime SYNTHROID 75 mcg tablet Generic drug:  levothyroxine What changed:  Another medication with the same name was removed. Continue taking this medication, and follow the directions you see here. Your last dose was: Your next dose is: Take 75 mcg by mouth every other day. 75 mcg CONTINUE taking these medications Instructions Each Dose to Equal  
 Morning Noon Evening Bedtime  
 calcium-vitamin D 500 mg(1,250mg) -200 unit per tablet Commonly known as:  OYSTER SHELL Your last dose was: Your next dose is: Take 1 Tab by mouth two (2) times daily (with meals). 1 Tab PNV#16-Iron Fum & PS-FA-OM-3 35-1-200 mg Cap Your last dose was: Your next dose is: Take  by mouth. Where to Get Your Medications Information on where to get these meds will be given to you by the nurse or doctor. ! Ask your nurse or doctor about these medications  
  ibuprofen 800 mg tablet  
 predniSONE 10 mg tablet

## 2018-06-27 NOTE — ANESTHESIA PROCEDURE NOTES
Epidural Block    Start time: 6/27/2018 11:30 AM  Performed by: Milagros Rodriguez  Authorized by: Milagros Rodriguez     Pre-Procedure  Indication: labor epidural    Preanesthetic Checklist: patient identified, risks and benefits discussed, anesthesia consent, site marked, patient being monitored, timeout performed and anesthesia consent    Timeout Time: 11:34        Epidural:   Patient position:  Seated  Prep region:  Lumbar  Prep: Chlorhexidine    Location:  L3-4    Needle and Epidural Catheter:   Needle Type:  Tuohy  Needle Gauge:  17 G  Injection Technique:  Loss of resistance using saline  Attempts:  2  Catheter Size:  20 G  Catheter at Skin Depth (cm):  5  Depth in Epidural Space (cm):  7  Events: no blood with aspiration, no cerebrospinal fluid with aspiration, no paresthesia and negative aspiration test    Test Dose:  Negative and lidocaine 1.5% w/ epi    Assessment:   Catheter Secured:  Tegaderm and tape  Insertion:  Uncomplicated  Patient tolerance:  Patient tolerated the procedure well with no immediate complications  Unable to thread catheter on 1st attempt. Repeat DAVIAN w/ PFNS. DAVIAN @ 7cm (same depth). Catheter threaded w/o difficulty.

## 2018-06-27 NOTE — IP AVS SNAPSHOT
[address] [address] 
[phone] Patient: Arsh Avitia MRN: [id number] MYN:2/24/2514 About your hospitalization You were admitted on:  June 27, 2018 You last received care in the:  [address] [place] [address] You were discharged on:  June 29, 2018 Why you were hospitalized Your primary diagnosis was:  Normal Vaginal Delivery Your diagnoses also included:  Iup (Intrauterine Pregnancy), Incidental, 39 Weeks Gestation Of Pregnancy, Back Pain With Sciatica Follow-up Information Follow up With Details Comments Contact Info None   None ([phone]) Patient stated that they have no PCP Irasema Sanford CNM Schedule an appointment as soon as possible for a visit in 6 weeks  [address] [place] [address] [address] [place] [address] 
[phone] Discharge Orders None A check cameron indicates which time of day the medication should be taken. My Medications START taking these medications Instructions Each Dose to Equal  
 Morning Noon Evening Bedtime  
 ibuprofen 800 mg tablet Commonly known as:  MOTRIN Your last dose was: Your next dose is: Take 1 Tab by mouth every eight (8) hours as needed. 800 mg  
    
   
   
   
  
 predniSONE 10 mg tablet Commonly known as:  Emre Razor Your last dose was: Your next dose is: Take 4 Tabs by mouth daily (with breakfast). Take 40 mg x 2 days, followed by 30 mg x 2 days, 20 mg x2 days and then 10 mg x 2 days 40 mg CHANGE how you take these medications Instructions Each Dose to Equal  
 Morning Noon Evening Bedtime SYNTHROID 75 mcg tablet Generic drug:  levothyroxine What changed:  Another medication with the same name was removed. Continue taking this medication, and follow the directions you see here. Your last dose was: Your next dose is: Take 75 mcg by mouth every other day. 75 mcg CONTINUE taking these medications Instructions Each Dose to Equal  
 Morning Noon Evening Bedtime  
 calcium-vitamin D 500 mg(1,250mg) -200 unit per tablet Commonly known as:  OYSTER SHELL Your last dose was: Your next dose is: Take 1 Tab by mouth two (2) times daily (with meals). 1 Tab PNV#16-Iron Fum & PS-FA-OM-3 35-1-200 mg Cap Your last dose was: Your next dose is: Take  by mouth. Where to Get Your Medications Information on where to get these meds will be given to you by the nurse or doctor. ! Ask your nurse or doctor about these medications  
  ibuprofen 800 mg tablet  
 predniSONE 10 mg tablet Discharge Instructions POST DELIVERY DISCHARGE INSTRUCTIONS Name: Esther Alcantara YOB: 1985 Primary Diagnosis: Active Problems: 
  IUP (intrauterine pregnancy), incidental (6/27/2018) 39 weeks gestation of pregnancy (6/27/2018) Back pain with sciatica (6/27/2018) General:  
 
Diet/Diet Restrictions: 
Eight 8-ounce glasses of fluid daily (water, juices); avoid excessive caffeine intake. Meals/snacks as desired which are high in fiber and carbohydrates and low in fat and cholesterol. Physical Activity / Restrictions / Safety:  
 
Avoid heavy lifting, no more than the baby alone (not the baby in the car seat). Avoid intercourse until you are seen at your postpartum visit. No douching or tampon use. Check with obstetrician before starting or resuming an exercise program.    
 
 
Discharge Instructions/Special Treatment/Home Care Needs:  
 
Continue prenatal vitamins. Continue to use squirt bottle with warm water on your perineum after each bathroom use until bleeding stops. Call your doctor for the following: Fever over 101 degrees by mouth. Vaginal bleeding that soaks two pads per hour for more than one hour. Red streaks or increased swelling of legs, painful red streaks on your breast. 
If you feel extremely anxious or overwhelmed. If you have thoughts of harming yourself and/or your baby. Pain Management:  
 
Pain Management:  
Take Acetaminophen (Tylenol) or Ibuprofen (Advil, Motrin), as directed for pain. Use a warm Sitz bath 3 times daily to relieve perineal or hemorrhoidal discomfort. For hemorrhoidal discomfort, use Tucks and Anusol cream as needed and directed. Follow-Up Care:  
 
Appointment with MD: Follow-up Appointments Procedures  FOLLOW UP VISIT Appointment in: 6 Weeks Standing Status:   Standing Number of Occurrences:   1 Order Specific Question:   Appointment in Answer:   6 Weeks Telephone number: [phone] Signed By: COLLEEN [employer] Activation Thank you for requesting access to Track. Please follow the instructions below to securely access and download your online medical record. Track allows you to send messages to your doctor, view your test results, renew your prescriptions, schedule appointments, and more. How Do I Sign Up? 1. In your internet browser, go to [email or web address] 
2. Click on the First Time User? Click Here link in the Sign In box. You will be redirect to the New Member Sign Up page. 3. Enter your Track Access Code exactly as it appears below. You will not need to use this code after youve completed the sign-up process. If you do not sign up before the expiration date, you must request a new code. Track Access Code: [email or web address] Expires: 2018 10:23 AM (This is the date your Track access code will ) 4. Enter the last four digits of your Social Security Number (xxxx) and Date of Birth (mm/dd/yyyy) as indicated and click Submit. You will be taken to the next sign-up page. 5. Create a "[employer] ID. This will be your "[employer] login ID and cannot be changed, so think of one that is secure and easy to remember. 6. Create a "[employer] password. You can change your password at any time. 7. Enter your Password Reset Question and Answer. This can be used at a later time if you forget your password. 8. Enter your e-mail address. You will receive e-mail notification when new information is available in [address] 9. Click Sign Up. You can now view and download portions of your medical record. 10. Click the Download Summary menu link to download a portable copy of your medical information. Additional Information If you have questions, please visit the Frequently Asked Questions section of the "[employer] website at [email or web address] Remember, "[employer] is NOT to be used for urgent needs. For medical emergencies, dial 911. Patient armband removed and shredded Introducing [facility]! Jimy Felton introduces "[employer] patient portal. Now you can access parts of your medical record, email your doctor's office, and request medication refills online. 1. In your internet browser, go to [email or web address] 2. Click on the First Time User? Click Here link in the Sign In box. You will see the New Member Sign Up page. 3. Enter your "[employer] Access Code exactly as it appears below. You will not need to use this code after youve completed the sign-up process. If you do not sign up before the expiration date, you must request a new code. · "[employer] Access Code: [email or web address] Expires: 9/27/2018 10:23 AM 
 
4. Enter the last four digits of your Social Security Number (xxxx) and Date of Birth (mm/dd/yyyy) as indicated and click Submit. You will be taken to the next sign-up page. 5. Create a [employer] ID. This will be your [employer] login ID and cannot be changed, so think of one that is secure and easy to remember. 6. Create a [employer] password. You can change your password at any time. 7. Enter your Password Reset Question and Answer. This can be used at a later time if you forget your password. 8. Enter your e-mail address. You will receive e-mail notification when new information is available in [address] 9. Click Sign Up. You can now view and download portions of your medical record. 10. Click the Download Summary menu link to download a portable copy of your medical information. If you have questions, please visit the Frequently Asked Questions section of the [employer] website. Remember, [employer] is NOT to be used for urgent needs. For medical emergencies, dial 911. Now available from your iPhone and Android! Introducing Srinivasa Gurrola As a [facility] patient, I wanted to make you aware of our electronic visit tool called Srinivasa Mariafin. [facility] 24/7 allows you to connect within minutes with a medical provider 24 hours a day, seven days a week via a mobile device or tablet or logging into a secure website from your computer. You can access Srinivasa Candacefin from anywhere in the [place]. A virtual visit might be right for you when you have a simple condition and feel like you just dont want to get out of bed, or cant get away from work for an appointment, when your regular [facility] provider is not available (evenings, weekends or holidays), or when youre out of town and need minor care. Electronic visits cost only $49 and if the [facility] 24/7 provider determines a prescription is needed to treat your condition, one can be electronically transmitted to a nearby pharmacy*. Please take a moment to enroll today if you have not already done so. The enrollment process is free and takes just a few minutes.   To enroll, please download the [employer] 24/7 malia to your tablet or phone, or visit [email or web address] to enroll on your computer. And, as an [address] [place] [address] patient with a [employer] account, the results of your visits will be scanned into your electronic medical record and your primary care provider will be able to view the scanned results. We urge you to continue to see your regular [employer] [facility] provider for your ongoing medical care. And while your primary care provider may not be the one available when you seek a [facility] [facility] virtual visit, the peace of mind you get from getting a real diagnosis real time can be priceless. For more information on [facility], view our Frequently Asked Questions (FAQs) at [email or web address]. Sincerely, 
 
Cesar Vale MD 
Chief Medical Officer [facility] Thelma Best *:  certain medications cannot be prescribed via [employer] Providers Seen During Your Hospitalization Provider Specialty Primary office phone Leslie Krishnan MD Obstetrics & Gynecology [phone] Your Primary Care Physician (PCP) Primary Care Physician Office Phone Office Fax NONE ** None ** ** None ** You are allergic to the following No active allergies Recent Documentation Height Weight Breastfeeding? BMI OB Status Smoking Status 1.626 m 93.4 kg Unknown 35.36 kg/m2 Recent pregnancy Never Smoker Emergency Contacts Name Discharge Info Relation Home Work Mobile ChuyitaBlaine DISCHARGE CAREGIVER [3] Spouse [3]   [phone] Patient Belongings The following personal items are in your possession at time of discharge: 
  Dental Appliances: None         Home Medications: Kept at bedside   Jewelry: Ring  Clothing: At bedside    Other Valuables: Cell Phone Discharge Instructions Attachments/References STROKE: SYMPTOMS: GENERAL INFO (ENGLISH) Patient Handouts Learning About FAST: Stroke Warning Signs What is FAST? FAST is a simple way to remember the main symptoms of stroke. Recognizing these symptoms helps you know when to call for medical help. FAST stands for: 
· Face drooping. · Arm weakness. · Speech difficulty. · Time to call 911. What happens when you have a stroke? A stroke occurs when a blood vessel to the brain bursts or is blocked by a blood clot. Within minutes, the nerve cells in that part of the brain die. As a result, the part of the body controlled by those cells cannot work properly. The effects of a stroke may range from mild to severe. They may get better, or they may last the rest of your life. A stroke can affect vision, speech, behavior, thought processes, and your ability to move. It can cause symptoms that may include: 
· Sudden numbness, tingling, weakness, or loss of movement in your face, arm, or leg, especially on only one side of your body. · Sudden vision changes. · Sudden trouble speaking. · Sudden confusion or trouble understanding simple statements. · Sudden problems with walking or balance. · A sudden, severe headache that is different from past headaches. Why is it important to get help FAST? Quick treatment may save your life. And it may reduce the damage in your brain so that you have fewer problems after the stroke. When you know the FAST symptoms, you will know when it's important to call for medical help. Where can you learn more? Go to http://dea-jose a.info/. Enter L991 in the search box to learn more about \"Learning About FAST: Stroke Warning Signs. \" Current as of: March 20, 2017 Content Version: 11.4 © 3225-1704 [employer]. Care instructions adapted under license by [employer] (which disclaims liability or warranty for this information).  If you have questions about a medical condition or this instruction, always ask your healthcare professional. Christelle Tolbert[employer] disclaims any warranty or liability for your use of this information. Please provide this summary of care documentation to your next provider. Signatures-by signing, you are acknowledging that this After Visit Summary has been reviewed with you and you have received a copy. Patient Signature:  ____________________________________________________________ Date:  ____________________________________________________________  
  
Berkley Moulds Provider Signature:  ____________________________________________________________ Date:  ____________________________________________________________

## 2018-06-27 NOTE — H&P
History & Physical    Name: Josh Joseph MRN: [id number]  SSN: [id number]    YOB: 1985  Age: 34 y.o. Sex: female        Subjective:     Estimated Date of Delivery: 7/3/18  OB History      Para Term  AB Living    3 2    2    SAB TAB Ectopic Molar Multiple Live Births                     Ms. Savannah Allen is admitted with pregnancy at 36w3d for induction of labor. Prenatal course was complicated by hypothyroidism and sciatica. Hypothyroidism being followed by Dr. Keith Clemens. She is on oral Synthroid with rotating dose 75 mg and 50 mg every other day. Patient also has hx of HPV and  delivery with last child @v 30 wks. Please see prenatal records for details. No Known Allergies    Objective:     Vitals:  Vitals:    18 1147 18 1150 18 1153 18 1155   BP: 131/81  119/49 120/54   Pulse: 92  79 80   Resp:       Temp:       SpO2:  100%  100%   Weight:       Height:            Physical Exam:  Patient without distress. Heart: Regular rate and rhythm or S1S2 present  Lung: clear to auscultation throughout lung fields, no wheezes, no rales, no rhonchi and normal respiratory effort  Back: costovertebral angle tenderness absent  Abdomen: soft, nontender  Fundus: soft and non tender  Perineum: blood absent, amniotic fluid absent  Cervical Exam: 380/-3 by nurse exam  Lower Extremities:  - Edema No   - No evidence of DVT seen on physical exam.  No cords or calf tenderness. No significant calf/ankle edema. Membranes:  Intact  Fetal Heart Rate & Contraction pattern: Reactive  Baseline: 125 per minute  Variability: moderate  Accelerations: yes  Uterine contractions: regular, every 2-3 minutes    Prenatal Labs:   No results found for: RUBELLAEXT, GRBSEXT, HBSAGEXT, HIVEXT, RPREXT, GONNOEXT, CHLAMEXT      Assessment/Plan:     Plan: Admit for induction for sciatica. Reassuring fetal status. Pitocin IV drip to be titrated for adequate labor and reassuring fetal status.   Labor Progressing normally, Continue plan for vaginal delivery. Group B Strep was negative.     Signed By:  Natacha Call CNM     June 27, 2018

## 2018-06-27 NOTE — ANESTHESIA PREPROCEDURE EVALUATION
Anesthetic History   No history of anesthetic complications            Review of Systems / Medical History  Patient summary reviewed, nursing notes reviewed and pertinent labs reviewed    Pulmonary          Smoker         Neuro/Psych   Within defined limits           Cardiovascular  Within defined limits                Exercise tolerance: >4 METS     GI/Hepatic/Renal                Endo/Other             Other Findings   Comments: \"Sciatica\" right         Physical Exam    Airway  Mallampati: II  TM Distance: 4 - 6 cm  Neck ROM: normal range of motion   Mouth opening: Normal     Cardiovascular               Dental  No notable dental hx       Pulmonary                 Abdominal         Other Findings            Anesthetic Plan    ASA: 2  Anesthesia type: epidural            Anesthetic plan and risks discussed with: Patient      Risks of epidural, including but not limited to bleeding, infection, back pain, headache, seizure, nerve injury, and block failure discussed with patient and accepted.

## 2018-06-27 NOTE — ANESTHESIA PROCEDURE NOTES
Epidural Block    Start time: 6/27/2018 6:44 PM  End time: 6/27/2018 7:02 PM  Performed by: Reyna Ely  Authorized by: Reyna Ely     Pre-Procedure  Indication: labor epidural    Timeout Time: 18:44        Epidural:   Patient position:  Seated  Prep region:  Lumbar  Prep: Chlorhexidine    Location:  L2-3    Needle and Epidural Catheter:   Needle Type:  Tuohy  Needle Gauge:  17 G  Injection Technique:  Loss of resistance using saline and loss of resistance using air  Attempts:  1  Catheter Size:  19 G  Catheter at Skin Depth (cm):  12  Depth in Epidural Space (cm):  9  Events: no blood with aspiration, no cerebrospinal fluid with aspiration, no paresthesia and negative aspiration test    Test Dose:  Lidocaine 1.5% w/ epi and negative    Assessment:   Catheter Secured:  Tape  Insertion:  Complicated  Patient tolerance:  Patient tolerated the procedure well with no immediate complications  Patient previous epidural not giving satisfactory relief. Requests epidural replacement. DAVIAN at 9 cm paramedian approach needed to get DAVIAN.

## 2018-06-28 LAB
HCT VFR BLD AUTO: 28.5 % (ref 35–45)
HGB BLD-MCNC: 9 G/DL (ref 12–16)

## 2018-06-28 PROCEDURE — 65270000029 HC RM PRIVATE

## 2018-06-28 PROCEDURE — 74011250637 HC RX REV CODE- 250/637: Performed by: ADVANCED PRACTICE MIDWIFE

## 2018-06-28 PROCEDURE — 85018 HEMOGLOBIN: CPT | Performed by: ADVANCED PRACTICE MIDWIFE

## 2018-06-28 PROCEDURE — 36415 COLL VENOUS BLD VENIPUNCTURE: CPT | Performed by: ADVANCED PRACTICE MIDWIFE

## 2018-06-28 PROCEDURE — 85014 HEMATOCRIT: CPT | Performed by: ADVANCED PRACTICE MIDWIFE

## 2018-06-28 PROCEDURE — 74011636637 HC RX REV CODE- 636/637: Performed by: ADVANCED PRACTICE MIDWIFE

## 2018-06-28 RX ORDER — IBUPROFEN 800 MG/1
800 TABLET ORAL
Qty: 90 TAB | Refills: 1 | Status: SHIPPED | OUTPATIENT
Start: 2018-06-28

## 2018-06-28 RX ORDER — PREDNISONE 20 MG/1
60 TABLET ORAL
Status: COMPLETED | OUTPATIENT
Start: 2018-06-28 | End: 2018-06-29

## 2018-06-28 RX ORDER — LEVOTHYROXINE SODIUM 100 UG/1
50 TABLET ORAL EVERY OTHER DAY
Status: DISCONTINUED | OUTPATIENT
Start: 2018-06-29 | End: 2018-06-29 | Stop reason: HOSPADM

## 2018-06-28 RX ORDER — PREDNISONE 10 MG/1
40 TABLET ORAL
Qty: 20 TAB | Refills: 0 | Status: SHIPPED | OUTPATIENT
Start: 2018-06-28

## 2018-06-28 RX ORDER — LEVOTHYROXINE SODIUM 75 UG/1
75 TABLET ORAL EVERY OTHER DAY
Status: DISCONTINUED | OUTPATIENT
Start: 2018-06-28 | End: 2018-06-29 | Stop reason: HOSPADM

## 2018-06-28 RX ORDER — CYCLOBENZAPRINE HCL 10 MG
10 TABLET ORAL
Status: DISCONTINUED | OUTPATIENT
Start: 2018-06-28 | End: 2018-06-29 | Stop reason: HOSPADM

## 2018-06-28 RX ADMIN — OXYCODONE HYDROCHLORIDE AND ACETAMINOPHEN 2 TABLET: 5; 325 TABLET ORAL at 20:38

## 2018-06-28 RX ADMIN — LEVOTHYROXINE SODIUM 75 MCG: 75 TABLET ORAL at 08:35

## 2018-06-28 RX ADMIN — CYCLOBENZAPRINE HYDROCHLORIDE 10 MG: 10 TABLET, FILM COATED ORAL at 16:02

## 2018-06-28 RX ADMIN — OXYCODONE HYDROCHLORIDE AND ACETAMINOPHEN 2 TABLET: 5; 325 TABLET ORAL at 03:49

## 2018-06-28 RX ADMIN — IBUPROFEN 800 MG: 400 TABLET ORAL at 09:23

## 2018-06-28 RX ADMIN — PREDNISONE 60 MG: 20 TABLET ORAL at 11:57

## 2018-06-28 RX ADMIN — OXYCODONE HYDROCHLORIDE AND ACETAMINOPHEN 2 TABLET: 5; 325 TABLET ORAL at 14:29

## 2018-06-28 NOTE — PROGRESS NOTES
0715 Bedside and verbal report received from Olga Linares RN. Assessment complete. Oriented to room and call bell. 0730 Mother feeding infant a bottle. 0915 Pt up to shower. [address] Anibal CNM at bedside. Discussed plan of care and increased sciatica pain. 1200 Family at bedside. 1435 TRANSFER - OUT REPORT:    Verbal report given to SDouglas Elizabeth Torres (name) on [facility]  being transferred to postpartum (unit) for routine progression of care       Report consisted of patients Situation, Background, Assessment and   Recommendations(SBAR). Information from the following report(s) SBAR, Kardex, Intake/Output and MAR was reviewed with the receiving nurse. Lines:   Peripheral IV 06/27/18 Left Forearm (Active)   Site Assessment Clean, dry, & intact 6/28/2018  9:15 AM   Phlebitis Assessment 0 6/28/2018  9:15 AM   Infiltration Assessment 0 6/28/2018  9:15 AM   Dressing Status Clean, dry, & intact 6/28/2018  9:15 AM   Dressing Type Tape;Transparent 6/28/2018  9:15 AM   Hub Color/Line Status Pink 6/28/2018  9:15 AM   Alcohol Cap Used Yes 6/28/2018  9:15 AM        Opportunity for questions and clarification was provided.       Patient transported with:   Registered Nurse

## 2018-06-28 NOTE — L&D DELIVERY NOTE
Delivery Note    Obstetrician:  Angelic Banks CNM    Assistant: none    Pre-Delivery Diagnosis: Term pregnancy, Induced labor or Pregnancy complicated by: hypothyroidism and sciatica    Post-Delivery Diagnosis: Living  infant(s) or Female    Intrapartum Event: None    Procedure: Spontaneous vaginal delivery, Nasopharyngeal/Oropharyngeal Suctioning or Warming/Drying    Epidural: YES    Monitor:  Fetal Heart Tones - External and Uterine Contractions - External    Indications for instrumental delivery: none    Estimated Blood Loss: 100 cc    Episiotomy: n/a    Laceration(s):  n/a    Laceration(s) repair: NO    Presentation: Cephalic    Fetal Description: iglesias    Fetal Position: Left Occiput Anterior    Birth Weight: 35872 g    Birth Length: 48 cm    Apgar - One Minute: 8    Apgar - Five Minutes: 9    Umbilical Cord: 3 vessels present and Cord blood collected for DNA ID card and also sent to lab for type, Rh, and Cortez' test    Placenta: Spontaneous delivery, intact. Specimens: no           Complications:  Shoulder dystocia    Comments: Maternal effort able to bring fetal head to . Infant head delivered in OA position and allowed to restitute to NOELLE. Gentle downward traction unable to release right anterior shoulder. Shoulder dystocia was identified and Neonatologist Molly Cheng and Nursery staff called. Mother was then instructed to stop pushing, and Asmita maneuver was attempted along with suprapubic pressure and maternal pushing effort. When this did not result in immediate release of anterior shoulder, the next maneuver was attempted. Osman Sandifer with Asmita was attempted and resulted in successful release of anterior right shoulder followed by quick delivery of posterior shoulder and infant body. Infant dried and stimulated and placed on maternal abdomen. Bulb suctioned done by LD nurse and resulted in strong cry. Cord quickly clamped X 2 and cut.  Infant then handed over to awaiting Neonatologist and Nursery staff to be evaluated. Placenta spontaneously delivered, intact. 3VC noted. IV pitocin infusion begun. Fundus was firm with massage and palpated at U-1. Bleeding well controlled. Inspection of perineum revealed intact vagina and perineum. No repair was required. EBL = 100 cc. Mother and infant stable. Routine postpartum maternal and infant care initiated. Sharps/Laps/Instrument counts correct at procedure end.              Cord Blood Results:   Information for the patient's :  Flynn Moss [[id number]]     Lab Results   Component Value Date/Time    ANOOP IgG NEG 2018 09:15 PM    ABO/Rh(D) A POSITIVE 2018 09:15 PM     Prenatal Labs:     Lab Results   Component Value Date/Time    ABO/Rh(D) Samuel Shock POSITIVE 2018 08:30 AM    HBsAg, External negative 2018    HIV, External negative 2018    Rubella, External immune 2018    RPR, External non reactive 2018    Gonorrhea, External negative 2018    Chlamydia, External negative 2018    GrBStrep, External negative 2018        Attending Attestation: I was present and scrubbed for the entire procedure    Signed By:  Radha Morgan CNM     2018

## 2018-06-28 NOTE — DISCHARGE INSTRUCTIONS
POST DELIVERY DISCHARGE INSTRUCTIONS    Name: Maritza Joiner  YOB: 1985  Primary Diagnosis: Active Problems:    IUP (intrauterine pregnancy), incidental (6/27/2018)      39 weeks gestation of pregnancy (6/27/2018)      Back pain with sciatica (6/27/2018)        General:     Diet/Diet Restrictions:  Eight 8-ounce glasses of fluid daily (water, juices); avoid excessive caffeine intake. Meals/snacks as desired which are high in fiber and carbohydrates and low in fat and cholesterol. Physical Activity / Restrictions / Safety:     Avoid heavy lifting, no more than the baby alone (not the baby in the car seat). Avoid intercourse until you are seen at your postpartum visit. No douching or tampon use. Check with obstetrician before starting or resuming an exercise program.         Discharge Instructions/Special Treatment/Home Care Needs:     Continue prenatal vitamins. Continue to use squirt bottle with warm water on your perineum after each bathroom use until bleeding stops. Call your doctor for the following:     Fever over 101 degrees by mouth. Vaginal bleeding that soaks two pads per hour for more than one hour. Red streaks or increased swelling of legs, painful red streaks on your breast.  If you feel extremely anxious or overwhelmed. If you have thoughts of harming yourself and/or your baby. Pain Management:     Pain Management:   Take Acetaminophen (Tylenol) or Ibuprofen (Advil, Motrin), as directed for pain. Use a warm Sitz bath 3 times daily to relieve perineal or hemorrhoidal discomfort. For hemorrhoidal discomfort, use Tucks and Anusol cream as needed and directed.     Follow-Up Care:     Appointment with MD:   Follow-up Appointments   Procedures    FOLLOW UP VISIT Appointment in: 6 Weeks     Standing Status:   Standing     Number of Occurrences:   1     Order Specific Question:   Appointment in     Answer:   6 Weeks     Telephone number: [phone]      Signed By: Griffin Waite COLLEEN       MyChart Activation    Thank you for requesting access to [employer] ([employer]). Please follow the instructions below to securely access and download your online medical record. [employer] ([employer]) allows you to send messages to your doctor, view your test results, renew your prescriptions, schedule appointments, and more. How Do I Sign Up? 1. In your internet browser, go to [email or web address]  2. Click on the First Time User? Click Here link in the Sign In box. You will be redirect to the New Member Sign Up page. 3. Enter your [employer]) Access Code exactly as it appears below. You will not need to use this code after youve completed the sign-up process. If you do not sign up before the expiration date, you must request a new code. [employer] ([employer]) Access Code: [email or web address]  Expires: 2018 10:23 AM (This is the date your [employer]) access code will )    4. Enter the last four digits of your Social Security Number (xxxx) and Date of Birth (mm/dd/yyyy) as indicated and click Submit. You will be taken to the next sign-up page. 5. Create a [employer]) ID. This will be your [employer]) login ID and cannot be changed, so think of one that is secure and easy to remember. 6. Create a [employer]) password. You can change your password at any time. 7. Enter your Password Reset Question and Answer. This can be used at a later time if you forget your password. 8. Enter your e-mail address. You will receive e-mail notification when new information is available in [address]. 9. Click Sign Up. You can now view and download portions of your medical record. 10. Click the Download Summary menu link to download a portable copy of your medical information. Additional Information    If you have questions, please visit the Frequently Asked Questions section of the [employer]) website at [email or web address] Remember, [employer] ([employer]) is NOT to be used for urgent needs. For medical emergencies, dial 911.       Patient armband removed and shredded

## 2018-06-28 NOTE — PROGRESS NOTES
-SBAR from Celine Gallegos RN.     -Pt is complete SVE by this nurse. CNM made aware. -CNM Raoul at bedside SVE having her trail push. -Stinson removed 200 ml output. - to a viable baby girl. Shoulder on right side. Peds at bedside examining baby. 715-SBAR given to HANANE Nelson.

## 2018-06-28 NOTE — PROGRESS NOTES
Patient was visited by [facility] volunteer Nai Barrett. Volunteer conducted a Spiritual Care Screening and reported no needs to this . Baby [place] Card and Spiritual Care literature were provided. Chaplains will continue to follow and will provide pastoral care as needed or requested. Rev.  [address] [facility]  [phone]

## 2018-06-28 NOTE — PROGRESS NOTES
Progress Note    Patient: Les Pat MRN: [id number]     YOB: 1985  Age: 34 y.o. Subjective:     Postpartum Day: 1    The patient is feeling well. Pain is  well controlled with current medications. Urinary output is adequate. Baby is feeding via bottle without difficulty. Objective:      Patient Vitals for the past 12 hrs:   Temp Pulse Resp BP   06/28/18 0915 98.2 °F (36.8 °C) 85 16 119/71   06/28/18 0522 98.7 °F (37.1 °C) 71 18 106/57   06/28/18 0104 - 83 - 125/58   06/28/18 0100 99 °F (37.2 °C) - 17 -       General:    alert, cooperative, no distress   Lochia:  appropriate   Uterine Fundus:   firm @ umbilicus    Perineum:  Intact    DVT Evaluation:  No evidence of DVT seen on physical exam.  Negative Hannah's sign. Lab/Data Review:  Recent Results (from the past 24 hour(s))   HEMOGLOBIN    Collection Time: 06/28/18  5:24 AM   Result Value Ref Range    HGB 9.0 (L) 12.0 - 16.0 g/dL   HEMATOCRIT    Collection Time: 06/28/18  5:24 AM   Result Value Ref Range    HCT 28.5 (L) 35.0 - 45.0 %     All lab results for the last 24 hours reviewed. Assessment:     Delivery: spontaneous vaginal delivery    Plan:     Doing well postpartum vaginal delivery. Continue current postpartum care. Encouraged hydration, nutrition and ambulation. Plan DC home tomorrow.     Signed By: Shanae Chahal CNM     June 28, 2018

## 2018-06-28 NOTE — PROGRESS NOTES
1440 Bedside and Verbal shift change report given to HANANE James (oncoming nurse) by Flora Pascual RN (offgoing nurse). Report included the following information SBAR, Kardex, Procedure Summary, Intake/Output, MAR, Recent Results and Med Rec Status. 1900 Bedside and Verbal shift change report given to TYESHA Pelayo RN (oncoming nurse) by Rubin Claros RN (offgoing nurse). Report included the following information SBAR, Kardex, Procedure Summary, Intake/Output, MAR, Accordion, Recent Results and Med Rec Status.

## 2018-06-29 VITALS
RESPIRATION RATE: 18 BRPM | BODY MASS INDEX: 35.17 KG/M2 | SYSTOLIC BLOOD PRESSURE: 131 MMHG | HEIGHT: 64 IN | DIASTOLIC BLOOD PRESSURE: 87 MMHG | OXYGEN SATURATION: 100 % | WEIGHT: 206 LBS | HEART RATE: 84 BPM | TEMPERATURE: 98.5 F

## 2018-06-29 PROBLEM — M54.9 BACK PAIN WITH SCIATICA: Status: RESOLVED | Noted: 2018-06-27 | Resolved: 2018-06-29

## 2018-06-29 PROBLEM — M54.30 BACK PAIN WITH SCIATICA: Status: RESOLVED | Noted: 2018-06-27 | Resolved: 2018-06-29

## 2018-06-29 PROBLEM — Z33.1 IUP (INTRAUTERINE PREGNANCY), INCIDENTAL: Status: RESOLVED | Noted: 2018-06-27 | Resolved: 2018-06-29

## 2018-06-29 PROBLEM — Z3A.39 39 WEEKS GESTATION OF PREGNANCY: Status: RESOLVED | Noted: 2018-06-27 | Resolved: 2018-06-29

## 2018-06-29 PROBLEM — O47.00 PRETERM CONTRACTIONS: Status: RESOLVED | Noted: 2018-03-27 | Resolved: 2018-06-29

## 2018-06-29 PROBLEM — Z3A.26 26 WEEKS GESTATION OF PREGNANCY: Status: RESOLVED | Noted: 2018-03-27 | Resolved: 2018-06-29

## 2018-06-29 PROBLEM — O09.219 HIGH RISK PREGNANCY DUE TO HISTORY OF PRETERM LABOR: Status: RESOLVED | Noted: 2018-03-27 | Resolved: 2018-06-29

## 2018-06-29 PROCEDURE — 74011636637 HC RX REV CODE- 636/637: Performed by: ADVANCED PRACTICE MIDWIFE

## 2018-06-29 PROCEDURE — 74011250637 HC RX REV CODE- 250/637: Performed by: ADVANCED PRACTICE MIDWIFE

## 2018-06-29 RX ADMIN — CYCLOBENZAPRINE HYDROCHLORIDE 10 MG: 10 TABLET, FILM COATED ORAL at 10:43

## 2018-06-29 RX ADMIN — IBUPROFEN 800 MG: 400 TABLET ORAL at 08:58

## 2018-06-29 RX ADMIN — OXYCODONE HYDROCHLORIDE AND ACETAMINOPHEN 2 TABLET: 5; 325 TABLET ORAL at 01:38

## 2018-06-29 RX ADMIN — LEVOTHYROXINE SODIUM 50 MCG: 100 TABLET ORAL at 07:00

## 2018-06-29 RX ADMIN — PREDNISONE 60 MG: 20 TABLET ORAL at 08:54

## 2018-06-29 NOTE — DISCHARGE SUMMARY
Obstetrical Discharge Summary     Name: Anatsasia Meeks MRN: [id number]  SSN: [id number]    YOB: 1985  Age: 34 y.o. Sex: female      Admit Date: 2018    Discharge Date: 2018    Admitting Physician: Lilliana Michaels MD     Attending Physician:  Lilliana Michaels MD     Discharge Diagnoses:   Information for the patient's :  Edna Ballesteros [[id number]]   Delivery of a 3.699 kg female infant via Vaginal, Spontaneous Delivery on 2018 at 8:40 PM  by . Apgars were 8 and 9. Additional Diagnoses:   Problem List as of 2018  Date Reviewed: 2018          Codes Class Noted - Resolved    * (Principal)Normal vaginal delivery ICD-10-CM: O80  ICD-9-CM: 650  2018 - Present        RESOLVED: IUP (intrauterine pregnancy), incidental ICD-10-CM: Z34.90  ICD-9-CM: V22.2  2018 - 2018        RESOLVED: 39 weeks gestation of pregnancy ICD-10-CM: Z3A.39  ICD-9-CM: V22.2  2018 - 2018        RESOLVED: Back pain with sciatica ICD-10-CM: M54.30, M54.9  ICD-9-CM: 724.3  2018 - 2018        RESOLVED: High risk pregnancy due to history of  labor ICD-10-CM: O09.219  ICD-9-CM: V23.41  3/27/2018 - 2018        RESOLVED:  contractions ICD-10-CM: O47.9  ICD-9-CM: 644.00  3/27/2018 - 2018        RESOLVED: 26 weeks gestation of pregnancy ICD-10-CM: Z3A.26  ICD-9-CM: V22.2  3/27/2018 - 2018              Lab Results   Component Value Date/Time    Rubella, External immune 2018    GrBStrep, External negative 2018     Recent Labs      18   0524   HGB  9.0*       Hospital Course: Normal hospital course following the delivery. Patient Instructions:   Current Discharge Medication List      START taking these medications    Details   ibuprofen (MOTRIN) 800 mg tablet Take 1 Tab by mouth every eight (8) hours as needed. Qty: 90 Tab, Refills: 1      predniSONE (DELTASONE) 10 mg tablet Take 4 Tabs by mouth daily (with breakfast).  Take 40 mg x 2 days, followed by 30 mg x 2 days, 20 mg x2 days and then 10 mg x 2 days  Qty: 20 Tab, Refills: 0         CONTINUE these medications which have NOT CHANGED    Details   PNV#16-Iron Fum & PS-FA-OM-3 35-1-200 mg cap Take  by mouth.      calcium-vitamin D (OYSTER SHELL) 500 mg(1,250mg) -200 unit per tablet Take 1 Tab by mouth two (2) times daily (with meals). levothyroxine (SYNTHROID) 75 mcg tablet Take 75 mcg by mouth every other day. Reference my discharge instructions.     Follow-up Appointments   Procedures    FOLLOW UP VISIT Appointment in: 6 Weeks     Standing Status:   Standing     Number of Occurrences:   1     Order Specific Question:   Appointment in     Answer:   6 Weeks        Signed By:  Olegario Avila CNM     June 29, 2018                       BST

## 2018-06-29 NOTE — PROGRESS NOTES
Patients having delivered by  section were instructed not to drive for the first 2 weeks, to only go up and down stairs 1 time in a day for 2 weeks as this will increase her risk for the incision to open. She was also instructed not to scrub the incision but to allow soap and water to fall onto it and to pat dry. Patient was given signs and symptoms of infection and instructed to call provider with temperature equal to or > than 100.4, foul smelling blood or discharge from the vagina, and increase in redness or discharge from incisions or an open wound that is not healing. Patient was also instructed on the signs of postpartum depression and instructed that if she is considering harming herself or her infant, feels out of control, unable to care for herself or baby, feels sad or depressed most of the day every day, is having trouble sleeping or sleeping too much or is having trouble bonding with her baby she is to call her provider or present in the emergency department. Patient was also provided with signs and symptoms of a pulmonary embolism (PE). She was told what a PE is and instructed to call 911 if she experiences SOB (fast, shallow, rapid respirations) at rest, chest pain that worsens when coughing or change in her level of consciousness. Patient was informed that she might experience blood pressure changes during the postpartum weeks and that she should contact her provider with any severe, constant headaches that do not respond to over-the-counter pain medication, rest and/or hydration. She is also to call her provider with any changes in vision, seeing spots, or flashing lights, pain in the upper right quadrant of the abdomen, swelling of the face, hands, and/or legs more than what is expected or a change in her level of consciousness.     Patient was strongly encouraged to keep her postpartum appointment and emphasized the importance of telling all providers her delivery date up until one year after the birth of her baby. Date of postpartum visit was confirmed prior to delivery.   All questions were answered and patient voiced understanding of the information provided.

## 2018-06-29 NOTE — PROGRESS NOTES
Bedside and Verbal shift change report given to JASBIR Hoover RN  (oncoming nurse) by TYESHA Pelayo RN  (offgoing nurse). Report included the following information SBAR, Kardex, Intake/Output, MAR, Accordion and Recent Results.